# Patient Record
Sex: MALE | URBAN - METROPOLITAN AREA
[De-identification: names, ages, dates, MRNs, and addresses within clinical notes are randomized per-mention and may not be internally consistent; named-entity substitution may affect disease eponyms.]

---
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
## 2024-01-15 NOTE — PROCEDURE: HIGH RISK MEDICATION MONITORING
----- Message from Lavern Guevara MD sent at 1/12/2024 11:50 AM CST -----  Hi,    Can you please help arrange for EGD with general anesthesia for this patient for gastrogastric fistula closure? Will need fluoroscopy. Schedule for 90 minutes please. Can be on an advanced endoscopy day    Lavern Guevara MD  [facility]  Interventional Endoscopy         Infliximab Counseling:  I discussed with the patient the risks of infliximab including but not limited to myelosuppression, immunosuppression, autoimmune hepatitis, demyelinating diseases, lymphoma, and serious infections.  The patient understands that monitoring is required including a PPD at baseline and must alert us or the primary physician if symptoms of infection or other concerning signs are noted.

## 2024-02-14 NOTE — PROCEDURE: HIGH RISK MEDICATION MONITORING
-3 Bexarotene Pregnancy And Lactation Text: This medication is Pregnancy Category X and should not be given to women who are pregnant or may become pregnant. This medication should not be used if you are breast feeding.

## 2024-04-19 NOTE — PROCEDURE: HIGH RISK MEDICATION MONITORING
Detail Level: Simple Detail Level: Detailed Detail Level: Zone Colchicine Counseling:  Patient counseled regarding adverse effects including but not limited to stomach upset (nausea, vomiting, stomach pain, or diarrhea).  Patient instructed to limit alcohol consumption while taking this medication.  Colchicine may reduce blood counts especially with prolonged use.  The patient understands that monitoring of kidney function and blood counts may be required, especially at baseline. The patient verbalized understanding of the proper use and possible adverse effects of colchicine.  All of the patient's questions and concerns were addressed.

## 2024-12-19 NOTE — PROCEDURE: HIGH RISK MEDICATION MONITORING
128 no abrasions, no jaundice, no lesions, no pruritis, and no rashes. Minocycline Counseling: Patient advised regarding possible photosensitivity and discoloration of the teeth, skin, lips, tongue and gums.  Patient instructed to avoid sunlight, if possible.  When exposed to sunlight, patients should wear protective clothing, sunglasses, and sunscreen.  The patient was instructed to call the office immediately if the following severe adverse effects occur:  hearing changes, easy bruising/bleeding, severe headache, or vision changes.  The patient verbalized understanding of the proper use and possible adverse effects of minocycline.  All of the patient's questions and concerns were addressed.

## 2025-01-27 NOTE — PROCEDURE: HIGH RISK MEDICATION MONITORING
Mom called back. Relayed recommendations, mom verbalized understanding.    Rituxan Counseling:  I discussed with the patient the risks of Rituxan infusions. Side effects can include infusion reactions, severe drug rashes including mucocutaneous reactions, reactivation of latent hepatitis and other infections and rarely progressive multifocal leukoencephalopathy.  All of the patient's questions and concerns were addressed.